# Patient Record
Sex: FEMALE | Race: WHITE | NOT HISPANIC OR LATINO | Employment: STUDENT | URBAN - METROPOLITAN AREA
[De-identification: names, ages, dates, MRNs, and addresses within clinical notes are randomized per-mention and may not be internally consistent; named-entity substitution may affect disease eponyms.]

---
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
## 2022-04-19 RX ORDER — SILVER SULFADIAZINE 10 G/1000G
CREAM TOPICAL
Qty: 50 G | Refills: 1 | Status: SHIPPED | OUTPATIENT
Start: 2022-04-19 | End: 2022-09-23

## 2022-09-23 ENCOUNTER — OFFICE VISIT (OUTPATIENT)
Dept: FAMILY MEDICINE | Facility: CLINIC | Age: 22
End: 2022-09-23
Payer: COMMERCIAL

## 2022-09-23 VITALS
SYSTOLIC BLOOD PRESSURE: 110 MMHG | BODY MASS INDEX: 21 KG/M2 | RESPIRATION RATE: 14 BRPM | HEART RATE: 68 BPM | HEIGHT: 69 IN | WEIGHT: 141.75 LBS | TEMPERATURE: 98 F | DIASTOLIC BLOOD PRESSURE: 68 MMHG

## 2022-09-23 DIAGNOSIS — R59.9 LYMPH NODE ENLARGEMENT: ICD-10-CM

## 2022-09-23 DIAGNOSIS — R22.9 LOCALIZED SOFT TISSUE SWELLING: Primary | ICD-10-CM

## 2022-09-23 PROBLEM — K21.9 GASTROESOPHAGEAL REFLUX DISEASE WITHOUT ESOPHAGITIS: Status: ACTIVE | Noted: 2022-09-23

## 2022-09-23 PROCEDURE — 3074F SYST BP LT 130 MM HG: CPT | Mod: CPTII,S$GLB,, | Performed by: STUDENT IN AN ORGANIZED HEALTH CARE EDUCATION/TRAINING PROGRAM

## 2022-09-23 PROCEDURE — 99999 PR PBB SHADOW E&M-EST. PATIENT-LVL IV: CPT | Mod: PBBFAC,,, | Performed by: STUDENT IN AN ORGANIZED HEALTH CARE EDUCATION/TRAINING PROGRAM

## 2022-09-23 PROCEDURE — 1159F PR MEDICATION LIST DOCUMENTED IN MEDICAL RECORD: ICD-10-PCS | Mod: CPTII,S$GLB,, | Performed by: STUDENT IN AN ORGANIZED HEALTH CARE EDUCATION/TRAINING PROGRAM

## 2022-09-23 PROCEDURE — 3078F DIAST BP <80 MM HG: CPT | Mod: CPTII,S$GLB,, | Performed by: STUDENT IN AN ORGANIZED HEALTH CARE EDUCATION/TRAINING PROGRAM

## 2022-09-23 PROCEDURE — 3078F PR MOST RECENT DIASTOLIC BLOOD PRESSURE < 80 MM HG: ICD-10-PCS | Mod: CPTII,S$GLB,, | Performed by: STUDENT IN AN ORGANIZED HEALTH CARE EDUCATION/TRAINING PROGRAM

## 2022-09-23 PROCEDURE — 99203 PR OFFICE/OUTPT VISIT, NEW, LEVL III, 30-44 MIN: ICD-10-PCS | Mod: S$GLB,,, | Performed by: STUDENT IN AN ORGANIZED HEALTH CARE EDUCATION/TRAINING PROGRAM

## 2022-09-23 PROCEDURE — 3074F PR MOST RECENT SYSTOLIC BLOOD PRESSURE < 130 MM HG: ICD-10-PCS | Mod: CPTII,S$GLB,, | Performed by: STUDENT IN AN ORGANIZED HEALTH CARE EDUCATION/TRAINING PROGRAM

## 2022-09-23 PROCEDURE — 1160F PR REVIEW ALL MEDS BY PRESCRIBER/CLIN PHARMACIST DOCUMENTED: ICD-10-PCS | Mod: CPTII,S$GLB,, | Performed by: STUDENT IN AN ORGANIZED HEALTH CARE EDUCATION/TRAINING PROGRAM

## 2022-09-23 PROCEDURE — 3008F PR BODY MASS INDEX (BMI) DOCUMENTED: ICD-10-PCS | Mod: CPTII,S$GLB,, | Performed by: STUDENT IN AN ORGANIZED HEALTH CARE EDUCATION/TRAINING PROGRAM

## 2022-09-23 PROCEDURE — 99999 PR PBB SHADOW E&M-EST. PATIENT-LVL IV: ICD-10-PCS | Mod: PBBFAC,,, | Performed by: STUDENT IN AN ORGANIZED HEALTH CARE EDUCATION/TRAINING PROGRAM

## 2022-09-23 PROCEDURE — 1159F MED LIST DOCD IN RCRD: CPT | Mod: CPTII,S$GLB,, | Performed by: STUDENT IN AN ORGANIZED HEALTH CARE EDUCATION/TRAINING PROGRAM

## 2022-09-23 PROCEDURE — 3008F BODY MASS INDEX DOCD: CPT | Mod: CPTII,S$GLB,, | Performed by: STUDENT IN AN ORGANIZED HEALTH CARE EDUCATION/TRAINING PROGRAM

## 2022-09-23 PROCEDURE — 99203 OFFICE O/P NEW LOW 30 MIN: CPT | Mod: S$GLB,,, | Performed by: STUDENT IN AN ORGANIZED HEALTH CARE EDUCATION/TRAINING PROGRAM

## 2022-09-23 PROCEDURE — 1160F RVW MEDS BY RX/DR IN RCRD: CPT | Mod: CPTII,S$GLB,, | Performed by: STUDENT IN AN ORGANIZED HEALTH CARE EDUCATION/TRAINING PROGRAM

## 2022-09-23 RX ORDER — DROSPIRENONE AND ESTETROL 3-14.2(28)
1 KIT ORAL DAILY
COMMUNITY

## 2022-09-23 NOTE — PROGRESS NOTES
Subjective:      Patient ID: Beth Torres is a 22 y.o. female    Chief Complaint   Patient presents with    Mass     HPI  22 y.o. female with a PMHx as documented below presents to clinic today for the following:  - Pt reports 2 lumps on left arm - one by the elbow that has been there for 3 weeks and another by the shoulder that has been there for 5 days. Associated symptoms include a dull, sore pain - pain is worse w/ palpation. No overlying skin changes. No reported trauma to the area. No associated fever, chills, night sweats, unintentional weight loss. Recent medical changes include new OCP for birth control.   - Discussed age appropriate preventative healthcare items such as cancer screenings and recommended immunizations. Patient declines labwork and recommended vaccines at today's visit. Discussed maintenance of a healthy weight. Patient queried if she has any additional questions about preventative healthcare and all questions were answered.     Review of Systems   Constitutional:  Negative for chills and fever.   HENT:  Negative for congestion and sore throat.    Respiratory:  Negative for shortness of breath.    Cardiovascular:  Negative for chest pain.   Gastrointestinal:  Negative for abdominal pain, constipation, diarrhea, nausea and vomiting.   Genitourinary:  Negative for dysuria.   Musculoskeletal:  Negative for back pain and neck pain.   Skin:  Negative for rash.   Neurological:  Negative for dizziness, sensory change, weakness and headaches.   Psychiatric/Behavioral:  Negative for depression. The patient is not nervous/anxious.      Past Medical History:   Diagnosis Date    Allergy     Gastroesophageal reflux disease without esophagitis 9/23/2022    Juvenile idiopathic scoliosis of thoracolumbar region 1/14/2019     Past Surgical History:   Procedure Laterality Date    ADENOIDECTOMY      TONSILLECTOMY       Review of patient's allergies indicates:   Allergen Reactions    Benadryl [diphenhydramine  hcl] Other (See Comments)     Lethargy     Family History   Problem Relation Age of Onset    Hypertension Father     Hypertension Maternal Grandmother     Hypertension Paternal Grandmother     Diabetes Paternal Grandmother     Hypothyroidism Paternal Grandfather      Social History     Tobacco Use    Smoking status: Never     Currently on File with [facility]:   Most Recent Immunizations   Administered Date(s) Administered    DTaP 08/04/2006    Hep B / HiB 01/08/2004    Hepatitis A, Pediatric/Adolescent, 2 Dose 08/04/2006    Hepatitis B 2002    IPV 08/04/2006    Influenza - Quadrivalent - PF *Preferred* (6 months and older) 10/25/2017    MMR 08/04/2006    Meningococcal Conjugate 07/15/2013    Meningococcal Conjugate (MCV4P) 07/15/2013    Pneumococcal Conjugate - 7 Valent 08/31/2004    Tdap 07/15/2013    Varicella 07/15/2013     Objective:      Vitals:    09/23/22 1006   BP: 110/68   Pulse: 68   Resp: 14   Temp: 98 °F (36.7 °C)     Physical Exam  Vitals reviewed.   Constitutional:       General: She is not in acute distress.  Cardiovascular:      Rate and Rhythm: Normal rate.      Heart sounds: Normal heart sounds. No murmur heard.  Pulmonary:      Effort: Pulmonary effort is normal. No respiratory distress.      Breath sounds: Normal breath sounds. No wheezing or rales.   Abdominal:      General: Bowel sounds are normal. There is no distension.      Palpations: Abdomen is soft.      Tenderness: There is no abdominal tenderness.   Musculoskeletal:      Comments: Ill-defined area of swelling to the LUE, proximal to the antecubital fossa.    Lymphadenopathy:      Upper Body:      Right upper body: No axillary adenopathy.      Left upper body: Axillary adenopathy (2 cm, firm, mobile axillary lymph node, tender to palpation) present.   Skin:     General: Skin is warm and dry.   Neurological:      General: No focal deficit present.      Mental Status: She is alert and oriented to person, place, and time. Mental  status is at baseline.      Assessment:       1. Localized soft tissue swelling    2. Lymph node enlargement      Plan:       Beth was seen today for mass.    Diagnoses and all orders for this visit:    Localized soft tissue swelling  -     US Soft Tissue Upper Extremity, Left; Future    Lymph node enlargement  -     US Soft Tissue Upper Extremity, Left; Future    No follow-ups on file.    Rere Loza MD  [facility]  Office: [phone]   Fax: [phone]  09/23/2022    Disclaimer: This note was partly generated using dictation software which may occasionally result in transcription errors.

## 2022-09-26 ENCOUNTER — HOSPITAL ENCOUNTER (OUTPATIENT)
Dept: RADIOLOGY | Facility: HOSPITAL | Age: 22
Discharge: HOME | End: 2022-09-26
Attending: STUDENT IN AN ORGANIZED HEALTH CARE EDUCATION/TRAINING PROGRAM
Payer: COMMERCIAL

## 2022-09-26 ENCOUNTER — TELEPHONE (OUTPATIENT)
Dept: FAMILY MEDICINE | Facility: CLINIC | Age: 22
End: 2022-09-26
Payer: COMMERCIAL

## 2022-09-26 DIAGNOSIS — R59.9 LYMPH NODE ENLARGEMENT: ICD-10-CM

## 2022-09-26 DIAGNOSIS — R22.9 LOCALIZED SOFT TISSUE SWELLING: ICD-10-CM

## 2022-09-26 PROCEDURE — 76882 US LMTD JT/FCL EVL NVASC XTR: CPT | Mod: TC,PO,LT

## 2022-09-26 PROCEDURE — 76882 US LMTD JT/FCL EVL NVASC XTR: CPT | Mod: 26,LT,, | Performed by: RADIOLOGY

## 2022-09-26 PROCEDURE — 76882 US SOFT TISSUE, UPPER EXTREMITY, LEFT: ICD-10-PCS | Mod: 26,LT,, | Performed by: RADIOLOGY

## 2022-09-26 NOTE — TELEPHONE ENCOUNTER
Called and spoke to patient's mom regarding a ultrasound results.  All questions answered, patient's mom expressed understanding.  Plan for repeat ultrasound in 6 weeks of symptoms persist or worsen.    Rere Loza MD  [facility]  Office: [phone]   Fax: [phone]  09/26/2022

## 2024-01-08 DIAGNOSIS — J01.00 ACUTE NON-RECURRENT MAXILLARY SINUSITIS: Primary | ICD-10-CM

## 2024-01-08 RX ORDER — CEFDINIR 300 MG/1
600 CAPSULE ORAL DAILY
Qty: 20 CAPSULE | Refills: 0 | Status: SHIPPED | OUTPATIENT
Start: 2024-01-08 | End: 2024-01-18

## 2024-01-08 RX ORDER — ALBUTEROL SULFATE 90 UG/1
2 AEROSOL, METERED RESPIRATORY (INHALATION) EVERY 4 HOURS PRN
Qty: 18 G | Refills: 2 | Status: SHIPPED | OUTPATIENT
Start: 2024-01-08 | End: 2024-07-06

## 2025-02-28 DIAGNOSIS — J01.00 ACUTE NON-RECURRENT MAXILLARY SINUSITIS: ICD-10-CM

## 2025-02-28 RX ORDER — ALBUTEROL SULFATE 90 UG/1
2 INHALANT RESPIRATORY (INHALATION) EVERY 4 HOURS PRN
Qty: 18 G | Refills: 2 | Status: SHIPPED | OUTPATIENT
Start: 2025-02-28 | End: 2025-08-27

## 2025-05-21 ENCOUNTER — OFFICE VISIT (OUTPATIENT)
Dept: FAMILY MEDICINE | Facility: CLINIC | Age: 25
End: 2025-05-21
Payer: COMMERCIAL

## 2025-05-21 VITALS
WEIGHT: 154.31 LBS | OXYGEN SATURATION: 99 % | RESPIRATION RATE: 12 BRPM | HEART RATE: 79 BPM | DIASTOLIC BLOOD PRESSURE: 70 MMHG | BODY MASS INDEX: 22.85 KG/M2 | TEMPERATURE: 98 F | HEIGHT: 69 IN | SYSTOLIC BLOOD PRESSURE: 100 MMHG

## 2025-05-21 DIAGNOSIS — J01.90 ACUTE SINUSITIS, RECURRENCE NOT SPECIFIED, UNSPECIFIED LOCATION: Primary | ICD-10-CM

## 2025-05-21 PROCEDURE — 3078F DIAST BP <80 MM HG: CPT | Mod: CPTII,S$GLB,,

## 2025-05-21 PROCEDURE — 3008F BODY MASS INDEX DOCD: CPT | Mod: CPTII,S$GLB,,

## 2025-05-21 PROCEDURE — 99999 PR PBB SHADOW E&M-EST. PATIENT-LVL IV: CPT | Mod: PBBFAC,,,

## 2025-05-21 PROCEDURE — 1160F RVW MEDS BY RX/DR IN RCRD: CPT | Mod: CPTII,S$GLB,,

## 2025-05-21 PROCEDURE — 3074F SYST BP LT 130 MM HG: CPT | Mod: CPTII,S$GLB,,

## 2025-05-21 PROCEDURE — 99213 OFFICE O/P EST LOW 20 MIN: CPT | Mod: S$GLB,,,

## 2025-05-21 PROCEDURE — 1159F MED LIST DOCD IN RCRD: CPT | Mod: CPTII,S$GLB,,

## 2025-05-21 RX ORDER — AZITHROMYCIN 250 MG/1
TABLET, FILM COATED ORAL
Qty: 6 TABLET | Refills: 0 | Status: SHIPPED | OUTPATIENT
Start: 2025-05-21 | End: 2025-05-26

## 2025-05-21 RX ORDER — PROMETHAZINE HYDROCHLORIDE AND DEXTROMETHORPHAN HYDROBROMIDE 6.25; 15 MG/5ML; MG/5ML
5 SYRUP ORAL EVERY 4 HOURS PRN
Qty: 118 ML | Refills: 0 | Status: SHIPPED | OUTPATIENT
Start: 2025-05-21 | End: 2025-05-31

## 2025-05-21 NOTE — PROGRESS NOTES
Subjective:       Patient ID:  [id number]     Chief Complaint: Cough      History of Present Illness    Ms. Torres presents today for persistent cough She experienced a sinus infection two weeks ago with symptoms including cough, sore throat, and loss of voice, which improved with cold medicine. Current cough started at the beginning of the week, occurring in episodes every 30 minutes with a productive sound. She also notes a mild runny nose. The cough significantly disrupts sleep, waking her during the night. She reports mild fatigue associated with these symptoms. She is taking Claritin, DayQuil, and Cough DM for symptom management. She has also been drinking hot tea. She has an inhaler prescribed for exercise-induced shortness of breath following a previous cold. She denies history of asthma.   No other concerns today.         Past Medical History:   Diagnosis Date    Allergy     Gastroesophageal reflux disease without esophagitis 9/23/2022    Juvenile idiopathic scoliosis of thoracolumbar region 1/14/2019      Active Problem List with Overview Notes    Diagnosis Date Noted    Gastroesophageal reflux disease without esophagitis 09/23/2022    Juvenile idiopathic scoliosis of thoracolumbar region 01/14/2019    Low back pain potentially associated with radiculopathy 02/07/2018      Review of patient's allergies indicates:   Allergen Reactions    Benadryl [diphenhydramine hcl] Other (See Comments)     Lethargy       Current Medications[1]    Lab Results   Component Value Date    WBC 6.60 01/14/2019    HGB 12.8 01/14/2019    HCT 40.2 01/14/2019     01/14/2019    ALT 20 11/28/2011    AST 35 11/28/2011     11/28/2011    K 4.0 11/28/2011     11/28/2011    CREATININE 0.6 11/28/2011    BUN 10 11/28/2011    CO2 27 11/28/2011       Review of Systems   Constitutional:  Positive for fatigue. Negative for fever.   HENT: Negative.  Negative for congestion, sneezing and sore throat.    Eyes: Negative.     Respiratory:  Positive for cough. Negative for shortness of breath and wheezing.    Cardiovascular:  Negative for chest pain, palpitations and leg swelling.   Gastrointestinal:  Negative for abdominal pain, nausea and vomiting.   Genitourinary: Negative.    Musculoskeletal: Negative.  Negative for arthralgias.   Skin: Negative.  Negative for rash.   Neurological:  Negative for dizziness, weakness, light-headedness, numbness and headaches.   Hematological: Negative.    Psychiatric/Behavioral: Negative.         Objective:      Physical Exam  Constitutional:       Appearance: Normal appearance.   HENT:      Head: Normocephalic and atraumatic.      Nose: Nose normal.   Eyes:      Extraocular Movements: Extraocular movements intact.   Cardiovascular:      Rate and Rhythm: Normal rate and regular rhythm.   Pulmonary:      Effort: Pulmonary effort is normal.      Breath sounds: Normal breath sounds.   Musculoskeletal:         General: Normal range of motion.      Cervical back: Normal range of motion.   Skin:     General: Skin is warm and dry.   Neurological:      General: No focal deficit present.      Mental Status: She is alert and oriented to person, place, and time.   Psychiatric:         Mood and Affect: Mood normal.         Assessment:       1. Acute sinusitis, recurrence not specified, unspecified location        Plan:       Beth was seen today for cough.    Diagnoses and all orders for this visit:    Acute sinusitis, recurrence not specified, unspecified location  -     azithromycin (Z-DAISY) 250 MG tablet; Take 2 tablets by mouth on day 1; Take 1 tablet by mouth on days 2-5  -     promethazine-dextromethorphan (PROMETHAZINE-DM) 6.25-15 mg/5 mL Syrp; Take 5 mLs by mouth every 4 (four) hours as needed (cough).    - Monitored the patient's wet-sounding cough which has persisted for about a week, with episodes occurring every 30 minutes and waking the patient up at night.  - Assessed the cough as likely viral in  origin, potentially progressing to bacterial infection.  - Noted that viral coughs can persist for 4-6 weeks.  - Prescribed Z-Daisy (azithromycin) to help with inflammation and symptoms.  - Prescribed promethazine DM cough syrup for nighttime use to address nocturnal symptoms and improve sleep.  - Advised the patient that the medication can be used during the day but may cause drowsiness.        Portions of this note were dictated using voice recognition software and may contain dictation related errors in spelling / grammar / syntax not discovered on text review.     Melodie Summers PA-C         [1]   Current Outpatient Medications:     albuterol (PROVENTIL/VENTOLIN HFA) 90 mcg/actuation inhaler, Inhale 2 puffs into the lungs every 4 (four) hours as needed for Wheezing or Shortness of Breath (coarse cough)., Disp: 18 g, Rfl: 2    azithromycin (Z-DAISY) 250 MG tablet, Take 2 tablets by mouth on day 1; Take 1 tablet by mouth on days 2-5, Disp: 6 tablet, Rfl: 0    drospirenone-estetrol (NEXTSTELLIS) 3 mg- 14.2 mg (28) Tab, Take 1 tablet by mouth once daily. (Patient not taking: Reported on 5/21/2025), Disp: , Rfl:     promethazine-dextromethorphan (PROMETHAZINE-DM) 6.25-15 mg/5 mL Syrp, Take 5 mLs by mouth every 4 (four) hours as needed (cough)., Disp: 118 mL, Rfl: 0